# Patient Record
Sex: MALE | Race: WHITE | NOT HISPANIC OR LATINO | Employment: OTHER | URBAN - NONMETROPOLITAN AREA
[De-identification: names, ages, dates, MRNs, and addresses within clinical notes are randomized per-mention and may not be internally consistent; named-entity substitution may affect disease eponyms.]

---

## 2023-11-08 ENCOUNTER — APPOINTMENT (OUTPATIENT)
Dept: SLEEP MEDICINE | Facility: HOSPITAL | Age: 43
End: 2023-11-08
Payer: MEDICAID

## 2023-11-15 ENCOUNTER — CLINICAL SUPPORT (OUTPATIENT)
Dept: SLEEP MEDICINE | Facility: CLINIC | Age: 43
End: 2023-11-15
Payer: MEDICAID

## 2023-11-15 VITALS
BODY MASS INDEX: 30.12 KG/M2 | HEIGHT: 65 IN | SYSTOLIC BLOOD PRESSURE: 120 MMHG | DIASTOLIC BLOOD PRESSURE: 86 MMHG | TEMPERATURE: 98 F | WEIGHT: 180.8 LBS

## 2023-11-15 DIAGNOSIS — G47.33 OBSTRUCTIVE SLEEP APNEA (ADULT) (PEDIATRIC): ICD-10-CM

## 2023-11-15 PROCEDURE — 95810 POLYSOM 6/> YRS 4/> PARAM: CPT | Performed by: INTERNAL MEDICINE

## 2023-11-15 ASSESSMENT — SLEEP AND FATIGUE QUESTIONNAIRES
HOW LIKELY ARE YOU TO NOD OFF OR FALL ASLEEP WHILE LYING DOWN TO REST IN THE AFTERNOON WHEN CIRCUMSTANCES PERMIT: HIGH CHANCE OF DOZING
HOW LIKELY ARE YOU TO NOD OFF OR FALL ASLEEP WHILE SITTING QUIETLY AFTER LUNCH WITHOUT ALCOHOL: SLIGHT CHANCE OF DOZING
HOW LIKELY ARE YOU TO NOD OFF OR FALL ASLEEP WHILE SITTING AND TALKING TO SOMEONE: SLIGHT CHANCE OF DOZING
HOW LIKELY ARE YOU TO NOD OFF OR FALL ASLEEP WHILE WATCHING TV: HIGH CHANCE OF DOZING
HOW LIKELY ARE YOU TO NOD OFF OR FALL ASLEEP IN A CAR, WHILE STOPPED FOR A FEW MINUTES IN TRAFFIC: WOULD NEVER DOZE
HOW LIKELY ARE YOU TO NOD OFF OR FALL ASLEEP WHILE SITTING AND READING: HIGH CHANCE OF DOZING
ESS-CHAD TOTAL SCORE: 16
HOW LIKELY ARE YOU TO NOD OFF OR FALL ASLEEP WHEN YOU ARE A PASSENGER IN A CAR FOR AN HOUR WITHOUT A BREAK: HIGH CHANCE OF DOZING
SITING INACTIVE IN A PUBLIC PLACE LIKE A CLASS ROOM OR A MOVIE THEATER: MODERATE CHANCE OF DOZING

## 2023-11-16 NOTE — PROGRESS NOTES
[facility] TECH NOTE:     Patient: Julián Ulloa   MRN//AGE: [id number]  1980  43 y.o.   Technologist: JUNIOR Irizarry   Room: 4   Service Date: 2023        Sleep Testing Location: [place] [address]      [place]: 8    TECHNOLOGIST SLEEP STUDY PROCEDURE NOTE:   This sleep study is being conducted according to the policies and procedures outlined by the [employer]AS accreditation standards.  The sleep study procedure and processes involved during this appointment was explained to the patient and all questions were answered. The patient  verbalized understanding.      The patient is a 43 y.o. year old male scheduled for aDiagnostic PSG Split night with montage of: PSG/CPAP. he arrived for his appointment as scheduled.      The study that was ultimately completed was a PSG with montage of: PSG.    The full study Was completed.  Patient questionnaires completed?: yes     Consents signed? yes    Initial Fall Risk Screening:     Julián has not fallen in the last 6 months.  Julián does have a fear of falling. He does need assistance with sitting, standing, or walking. he does need assistance walking in his home. he does need assistance in an unfamiliar setting. The patient isusing an assistive device.     Brief Study observations: Mr. Ulloa arrived via nursing home transportation. He was escorted to room 4 where consent was signed and all questions were answered. Criteria for split study was not met as per department protocol. He did experience REM related obstructive hypopneas early in the study. Later in the study, he expereinced REM and NREM related events while supine and on his side. Events late in the study included obstructive, central and mixed apneas. He experienced significant desaturations during REM sleep late in the study. Ricco Spo2 low 60s with supine events. Frequent, loud snoring was noted. He self-administered all nighttime medications before bed.    Deviation to order/protocol and reason: Qualifications  were not met for a split study as per departmental protocol.        After the procedure, the patient was informed to ensure followup with ordering clinician for testing results. A note was sent to nursing  home staff reminding them to schedule an appointment for follow up with his physician.      Technologist: JUNIOR Irizarry

## 2024-08-08 PROBLEM — E11.21: Status: ACTIVE | Noted: 2024-08-08

## 2024-08-08 PROBLEM — F17.210 CIGARETTE NICOTINE DEPENDENCE WITHOUT COMPLICATION: Status: ACTIVE | Noted: 2024-08-08

## 2024-08-08 PROBLEM — I12.9 HYPERTENSIVE CHRONIC KIDNEY DISEASE WITH STAGE 1 THROUGH STAGE 4 CHRONIC KIDNEY DISEASE, OR UNSPECIFIED CHRONIC KIDNEY DISEASE: Status: ACTIVE | Noted: 2024-08-08

## 2024-08-08 PROBLEM — G47.00 INSOMNIA, UNSPECIFIED: Status: ACTIVE | Noted: 2024-08-08

## 2024-08-08 PROBLEM — R13.11 DYSPHAGIA, ORAL PHASE: Status: ACTIVE | Noted: 2024-08-08

## 2024-08-08 PROBLEM — I69.354 HEMIPLEGIA AND HEMIPARESIS FOLLOWING CEREBRAL INFARCTION AFFECTING LEFT NON-DOMINANT SIDE (MULTI): Status: ACTIVE | Noted: 2024-08-08

## 2024-08-08 PROBLEM — E78.5 HYPERLIPIDEMIA: Status: ACTIVE | Noted: 2024-08-08

## 2024-08-08 PROBLEM — M62.81 MUSCLE WEAKNESS (GENERALIZED): Status: ACTIVE | Noted: 2024-08-08

## 2024-08-08 PROBLEM — F32.5 MAJOR DEPRESSIVE DISORDER, SINGLE EPISODE IN FULL REMISSION (CMS-HCC): Status: ACTIVE | Noted: 2024-08-08

## 2024-12-17 ENCOUNTER — NURSING HOME VISIT (OUTPATIENT)
Facility: EXTERNAL LOCATION | Age: 44
End: 2024-12-17
Payer: MEDICARE

## 2024-12-17 DIAGNOSIS — I69.959 HEMIPLEGIA OF NONDOMINANT SIDE AS LATE EFFECT OF CEREBROVASCULAR DISEASE (MULTI): ICD-10-CM

## 2024-12-17 DIAGNOSIS — Z79.4 TYPE 2 DIABETES MELLITUS WITH DIABETIC NEPHROPATHY, WITH LONG-TERM CURRENT USE OF INSULIN: ICD-10-CM

## 2024-12-17 DIAGNOSIS — E11.21 TYPE 2 DIABETES MELLITUS WITH DIABETIC NEPHROPATHY, WITH LONG-TERM CURRENT USE OF INSULIN: ICD-10-CM

## 2024-12-17 DIAGNOSIS — I12.9 HYPERTENSIVE CHRONIC KIDNEY DISEASE WITH STAGE 1 THROUGH STAGE 4 CHRONIC KIDNEY DISEASE, OR UNSPECIFIED CHRONIC KIDNEY DISEASE: Primary | ICD-10-CM

## 2024-12-17 PROCEDURE — 99305 1ST NF CARE MODERATE MDM 35: CPT | Performed by: INTERNAL MEDICINE

## 2024-12-17 NOTE — PROGRESS NOTES
Name: Julián Ulloa  YOB: 1980    INITIAL VISIT: [facility],     [facility]   Patient seen and examined here at [facility].  He was resting comfortably in bed when I saw him  Was easily awakened.  Seems to be doing well without any major complaints or needs at this time  REVIEW OF SYSTEMS:  Review of systems are negative except where noted in HPI.    There were no vitals taken for this visit.   Vitals reviewed and blood pressure quite well-controlled  Physical Exam  Constitutional:       Appearance: Normal appearance.   HENT:      Head: Normocephalic and atraumatic.      Right Ear: External ear normal.      Left Ear: External ear normal.      Nose: Nose normal.      Mouth/Throat:      Mouth: Mucous membranes are moist.      Pharynx: Oropharynx is clear.   Eyes:      Extraocular Movements: Extraocular movements intact.      Conjunctiva/sclera: Conjunctivae normal.      Pupils: Pupils are equal, round, and reactive to light.   Cardiovascular:      Rate and Rhythm: Normal rate and regular rhythm.   Pulmonary:      Effort: Pulmonary effort is normal.      Breath sounds: Normal breath sounds.   Abdominal:      General: Abdomen is flat.      Palpations: Abdomen is soft.   Skin:     General: Skin is warm and dry.   Neurological:      General: No focal deficit present.      Mental Status: He is alert and oriented to person, place, and time.   Psychiatric:         Mood and Affect: Mood normal.         Behavior: Behavior normal.            CODE STATUS: Full code      No visits with results within 1 Week(s) from this visit.   Latest known visit with results is:   No results found for any previous visit.       LABORATORIES OR DIAGNOSTIC TESTS DONE/REVIEWED IN FACILITY:    Not on File      MEDICATIONS RECONCILED AT THE FACILITY:  Please see Nursing facility Medication EMR for full updated list.    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

## 2024-12-17 NOTE — LETTER
Patient: Julián Ulloa  : 1980    Encounter Date: 2024    Name: Julián Ulloa  YOB: 1980    INITIAL VISIT: [facility],     [facility]   Patient seen and examined here at [facility].  He was resting comfortably in bed when I saw him  Was easily awakened.  Seems to be doing well without any major complaints or needs at this time  REVIEW OF SYSTEMS:  Review of systems are negative except where noted in HPI.    There were no vitals taken for this visit.   Vitals reviewed and blood pressure quite well-controlled  Physical Exam  Constitutional:       Appearance: Normal appearance.   HENT:      Head: Normocephalic and atraumatic.      Right Ear: External ear normal.      Left Ear: External ear normal.      Nose: Nose normal.      Mouth/Throat:      Mouth: Mucous membranes are moist.      Pharynx: Oropharynx is clear.   Eyes:      Extraocular Movements: Extraocular movements intact.      Conjunctiva/sclera: Conjunctivae normal.      Pupils: Pupils are equal, round, and reactive to light.   Cardiovascular:      Rate and Rhythm: Normal rate and regular rhythm.   Pulmonary:      Effort: Pulmonary effort is normal.      Breath sounds: Normal breath sounds.   Abdominal:      General: Abdomen is flat.      Palpations: Abdomen is soft.   Skin:     General: Skin is warm and dry.   Neurological:      General: No focal deficit present.      Mental Status: He is alert and oriented to person, place, and time.   Psychiatric:         Mood and Affect: Mood normal.         Behavior: Behavior normal.            CODE STATUS: Full code      No visits with results within 1 Week(s) from this visit.   Latest known visit with results is:   No results found for any previous visit.       LABORATORIES OR DIAGNOSTIC TESTS DONE/REVIEWED IN FACILITY:    Not on File      MEDICATIONS RECONCILED AT THE FACILITY:  Please see Nursing facility Medication EMR for full updated list.    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

## 2025-01-28 ENCOUNTER — NURSING HOME VISIT (OUTPATIENT)
Facility: EXTERNAL LOCATION | Age: 45
End: 2025-01-28
Payer: MEDICARE

## 2025-01-28 DIAGNOSIS — Z79.4 TYPE 2 DIABETES MELLITUS WITH DIABETIC NEPHROPATHY, WITH LONG-TERM CURRENT USE OF INSULIN: ICD-10-CM

## 2025-01-28 DIAGNOSIS — E11.21 TYPE 2 DIABETES MELLITUS WITH DIABETIC NEPHROPATHY, WITH LONG-TERM CURRENT USE OF INSULIN: ICD-10-CM

## 2025-01-28 DIAGNOSIS — E78.2 MIXED HYPERLIPIDEMIA: Primary | ICD-10-CM

## 2025-01-28 PROCEDURE — 99308 SBSQ NF CARE LOW MDM 20: CPT | Performed by: INTERNAL MEDICINE

## 2025-01-28 NOTE — PROGRESS NOTES
Name: Julián Ulloa  YOB: 1980    FOLLOW UP VISIT: SNF,     SUBJECTIVE:  Patient seen and examined at the bedside here at [facility]  He is resting comfortably in bed  Denies any major complaints at this time  Denies any needs not being met  REVIEW OF SYSTEMS:   All review of systems are negative unless otherwise stated above under subjective.    LABS REVIEWED AT FACILITY:       OBJECTIVE:  Vitals reviewed and very stable at this time  Physical Exam  Constitutional:       Appearance: Normal appearance.   HENT:      Head: Normocephalic and atraumatic.      Right Ear: External ear normal.      Left Ear: External ear normal.      Nose: Nose normal.      Mouth/Throat:      Mouth: Mucous membranes are moist.      Pharynx: Oropharynx is clear.   Eyes:      Extraocular Movements: Extraocular movements intact.      Conjunctiva/sclera: Conjunctivae normal.      Pupils: Pupils are equal, round, and reactive to light.   Cardiovascular:      Rate and Rhythm: Normal rate and regular rhythm.   Pulmonary:      Effort: Pulmonary effort is normal.      Breath sounds: Normal breath sounds.   Abdominal:      General: Abdomen is flat.      Palpations: Abdomen is soft.   Skin:     General: Skin is warm and dry.   Neurological:      General: No focal deficit present.      Mental Status: He is alert and oriented to person, place, and time.   Psychiatric:         Mood and Affect: Mood normal.         Behavior: Behavior normal.         Assessment/Plan   Problem List Items Addressed This Visit       Type 2 diabetes mellitus with established diabetic nephropathy    Hyperlipidemia - Primary   Plan:  Medications are reviewed  Vitals are stable  Clinically appears stable  CODE STATUS is full  Continue medical support as he is on      Keron Whiting DO

## 2025-01-28 NOTE — LETTER
Patient: Julián Ulloa  : 1980    Encounter Date: 2025    Name: Julián Ulloa  YOB: 1980    FOLLOW UP VISIT: SNF,     SUBJECTIVE:  Patient seen and examined at the bedside here at [facility]  He is resting comfortably in bed  Denies any major complaints at this time  Denies any needs not being met  REVIEW OF SYSTEMS:   All review of systems are negative unless otherwise stated above under subjective.    LABS REVIEWED AT FACILITY:       OBJECTIVE:  Vitals reviewed and very stable at this time  Physical Exam  Constitutional:       Appearance: Normal appearance.   HENT:      Head: Normocephalic and atraumatic.      Right Ear: External ear normal.      Left Ear: External ear normal.      Nose: Nose normal.      Mouth/Throat:      Mouth: Mucous membranes are moist.      Pharynx: Oropharynx is clear.   Eyes:      Extraocular Movements: Extraocular movements intact.      Conjunctiva/sclera: Conjunctivae normal.      Pupils: Pupils are equal, round, and reactive to light.   Cardiovascular:      Rate and Rhythm: Normal rate and regular rhythm.   Pulmonary:      Effort: Pulmonary effort is normal.      Breath sounds: Normal breath sounds.   Abdominal:      General: Abdomen is flat.      Palpations: Abdomen is soft.   Skin:     General: Skin is warm and dry.   Neurological:      General: No focal deficit present.      Mental Status: He is alert and oriented to person, place, and time.   Psychiatric:         Mood and Affect: Mood normal.         Behavior: Behavior normal.         Assessment/Plan  Problem List Items Addressed This Visit       Type 2 diabetes mellitus with established diabetic nephropathy    Hyperlipidemia - Primary   Plan:  Medications are reviewed  Vitals are stable  Clinically appears stable  CODE STATUS is full  Continue medical support as he is on      Keron Whiting DO      Electronically Signed By: Keron Whiting DO   25 10:13 AM

## 2025-04-22 ENCOUNTER — NURSING HOME VISIT (OUTPATIENT)
Facility: EXTERNAL LOCATION | Age: 45
End: 2025-04-22
Payer: MEDICARE

## 2025-04-22 DIAGNOSIS — I69.354 HEMIPLEGIA AND HEMIPARESIS FOLLOWING CEREBRAL INFARCTION AFFECTING LEFT NON-DOMINANT SIDE (MULTI): ICD-10-CM

## 2025-04-22 DIAGNOSIS — E11.21 TYPE 2 DIABETES MELLITUS WITH DIABETIC NEPHROPATHY, WITHOUT LONG-TERM CURRENT USE OF INSULIN: ICD-10-CM

## 2025-04-22 DIAGNOSIS — I12.9 HYPERTENSIVE CHRONIC KIDNEY DISEASE WITH STAGE 1 THROUGH STAGE 4 CHRONIC KIDNEY DISEASE, OR UNSPECIFIED CHRONIC KIDNEY DISEASE: ICD-10-CM

## 2025-04-22 DIAGNOSIS — E78.2 MIXED HYPERLIPIDEMIA: Primary | ICD-10-CM

## 2025-04-22 PROCEDURE — 99308 SBSQ NF CARE LOW MDM 20: CPT | Performed by: INTERNAL MEDICINE

## 2025-04-22 NOTE — PROGRESS NOTES
Name: Julián Ulloa  YOB: 1980    FOLLOW UP VISIT: SNF,     SUBJECTIVE:  Patient seen and examined here at [facility] for follow-up  When I saw him he is resting comfortably in bed  Has no major complaints and states he is doing well without any issues currently    REVIEW OF SYSTEMS:   All review of systems are negative unless otherwise stated above under subjective.    LABS REVIEWED AT FACILITY:       OBJECTIVE:  132/84 at last check  Physical Exam  Constitutional:       Appearance: Normal appearance.   HENT:      Head: Normocephalic and atraumatic.      Right Ear: External ear normal.      Left Ear: External ear normal.      Nose: Nose normal.      Mouth/Throat:      Mouth: Mucous membranes are moist.      Pharynx: Oropharynx is clear.   Eyes:      Extraocular Movements: Extraocular movements intact.      Conjunctiva/sclera: Conjunctivae normal.      Pupils: Pupils are equal, round, and reactive to light.   Cardiovascular:      Rate and Rhythm: Normal rate and regular rhythm.   Pulmonary:      Effort: Pulmonary effort is normal.      Breath sounds: Normal breath sounds.   Abdominal:      General: Abdomen is flat.      Palpations: Abdomen is soft.   Skin:     General: Skin is warm and dry.   Neurological:      General: No focal deficit present.      Mental Status: He is alert and oriented to person, place, and time.   Psychiatric:         Mood and Affect: Mood normal.         Behavior: Behavior normal.         Assessment/Plan   Problem List Items Addressed This Visit       Hemiplegia and hemiparesis following cerebral infarction affecting left non-dominant side (Multi)    Type 2 diabetes mellitus with established diabetic nephropathy    Hypertensive chronic kidney disease with stage 1 through stage 4 chronic kidney disease, or unspecified chronic kidney disease    Hyperlipidemia - Primary   Plan:  Full code  Medications reviewed  Vitals are stable  Has no major complaints seems to be  doing well with no issues currently at this time  Continue supportive measures as he is on      Keron Whiting, DO

## 2025-04-22 NOTE — LETTER
Patient: Julián Ulloa  : 1980    Encounter Date: 2025    Name: Julián Ulloa  YOB: 1980    FOLLOW UP VISIT: SNF,     SUBJECTIVE:  Patient seen and examined here at [facility] for follow-up  When I saw him he is resting comfortably in bed  Has no major complaints and states he is doing well without any issues currently    REVIEW OF SYSTEMS:   All review of systems are negative unless otherwise stated above under subjective.    LABS REVIEWED AT FACILITY:       OBJECTIVE:  132/84 at last check  Physical Exam  Constitutional:       Appearance: Normal appearance.   HENT:      Head: Normocephalic and atraumatic.      Right Ear: External ear normal.      Left Ear: External ear normal.      Nose: Nose normal.      Mouth/Throat:      Mouth: Mucous membranes are moist.      Pharynx: Oropharynx is clear.   Eyes:      Extraocular Movements: Extraocular movements intact.      Conjunctiva/sclera: Conjunctivae normal.      Pupils: Pupils are equal, round, and reactive to light.   Cardiovascular:      Rate and Rhythm: Normal rate and regular rhythm.   Pulmonary:      Effort: Pulmonary effort is normal.      Breath sounds: Normal breath sounds.   Abdominal:      General: Abdomen is flat.      Palpations: Abdomen is soft.   Skin:     General: Skin is warm and dry.   Neurological:      General: No focal deficit present.      Mental Status: He is alert and oriented to person, place, and time.   Psychiatric:         Mood and Affect: Mood normal.         Behavior: Behavior normal.         Assessment/Plan  Problem List Items Addressed This Visit       Hemiplegia and hemiparesis following cerebral infarction affecting left non-dominant side (Multi)    Type 2 diabetes mellitus with established diabetic nephropathy    Hypertensive chronic kidney disease with stage 1 through stage 4 chronic kidney disease, or unspecified chronic kidney disease    Hyperlipidemia - Primary   Plan:  Full  code  Medications reviewed  Vitals are stable  Has no major complaints seems to be doing well with no issues currently at this time  Continue supportive measures as he is on      Keron Whiting DO    Electronically Signed By: Keron Whiting DO   4/22/25  4:00 PM

## 2025-07-14 ENCOUNTER — NURSING HOME VISIT (OUTPATIENT)
Facility: EXTERNAL LOCATION | Age: 45
End: 2025-07-14
Payer: MEDICARE

## 2025-07-14 DIAGNOSIS — I69.354 HEMIPLEGIA AND HEMIPARESIS FOLLOWING CEREBRAL INFARCTION AFFECTING LEFT NON-DOMINANT SIDE (MULTI): ICD-10-CM

## 2025-07-14 DIAGNOSIS — E78.2 MIXED HYPERLIPIDEMIA: Primary | ICD-10-CM

## 2025-07-14 DIAGNOSIS — E11.21 TYPE 2 DIABETES MELLITUS WITH DIABETIC NEPHROPATHY, WITHOUT LONG-TERM CURRENT USE OF INSULIN: ICD-10-CM

## 2025-07-14 DIAGNOSIS — M62.81 MUSCLE WEAKNESS (GENERALIZED): ICD-10-CM

## 2025-07-14 PROCEDURE — 99308 SBSQ NF CARE LOW MDM 20: CPT | Performed by: INTERNAL MEDICINE

## 2025-07-14 NOTE — LETTER
Patient: Julián Ulloa  : 1980    Encounter Date: 2025    Name: Julián Ulloa  YOB: 1980    FOLLOW UP VISIT: SNF,     SUBJECTIVE:  Patient seen and examined at [facility] for follow-up.  When I saw him he is resting in bed.  He is on his devices and listening to music  He states he is doing well.  He states he is getting everything he needs has no major complaints at this time  He was inquiring about his make-up drinks that he has ordered and have not come in yet  Otherwise no issues or needs at this time  REVIEW OF SYSTEMS:   All review of systems are negative unless otherwise stated above under subjective.    LABS REVIEWED AT FACILITY:       OBJECTIVE:  Blood pressure is 131/67  Physical Exam  Constitutional:       Appearance: Normal appearance.   HENT:      Head: Normocephalic and atraumatic.      Right Ear: External ear normal.      Left Ear: External ear normal.      Nose: Nose normal.      Mouth/Throat:      Mouth: Mucous membranes are moist.      Pharynx: Oropharynx is clear.   Eyes:      Extraocular Movements: Extraocular movements intact.      Conjunctiva/sclera: Conjunctivae normal.      Pupils: Pupils are equal, round, and reactive to light.   Cardiovascular:      Rate and Rhythm: Normal rate and regular rhythm.   Pulmonary:      Effort: Pulmonary effort is normal.      Breath sounds: Normal breath sounds.   Abdominal:      General: Abdomen is flat.      Palpations: Abdomen is soft.   Skin:     General: Skin is warm and dry.   Neurological:      General: No focal deficit present.      Mental Status: He is alert and oriented to person, place, and time.   Psychiatric:         Mood and Affect: Mood normal.         Behavior: Behavior normal.         Assessment/Plan  Problem List Items Addressed This Visit       Hemiplegia and hemiparesis following cerebral infarction affecting left non-dominant side (Multi)    Type 2 diabetes mellitus with established diabetic  nephropathy    Hyperlipidemia - Primary    Muscle weakness (generalized)   Plan:  Full code  At this time vitals are stable  Medications are reviewed and appear appropriate  Continue his medical management and supportive measures as he is on        Keron Whiting DO    Electronically Signed By: Keron Whiting DO   7/14/25  2:16 PM

## 2025-07-14 NOTE — PROGRESS NOTES
Name: Julián Ulloa  YOB: 1980    FOLLOW UP VISIT: SNF,     SUBJECTIVE:  Patient seen and examined at [facility] for follow-up.  When I saw him he is resting in bed.  He is on his devices and listening to music  He states he is doing well.  He states he is getting everything he needs has no major complaints at this time  He was inquiring about his make-up drinks that he has ordered and have not come in yet  Otherwise no issues or needs at this time  REVIEW OF SYSTEMS:   All review of systems are negative unless otherwise stated above under subjective.    LABS REVIEWED AT FACILITY:       OBJECTIVE:  Blood pressure is 131/67  Physical Exam  Constitutional:       Appearance: Normal appearance.   HENT:      Head: Normocephalic and atraumatic.      Right Ear: External ear normal.      Left Ear: External ear normal.      Nose: Nose normal.      Mouth/Throat:      Mouth: Mucous membranes are moist.      Pharynx: Oropharynx is clear.   Eyes:      Extraocular Movements: Extraocular movements intact.      Conjunctiva/sclera: Conjunctivae normal.      Pupils: Pupils are equal, round, and reactive to light.   Cardiovascular:      Rate and Rhythm: Normal rate and regular rhythm.   Pulmonary:      Effort: Pulmonary effort is normal.      Breath sounds: Normal breath sounds.   Abdominal:      General: Abdomen is flat.      Palpations: Abdomen is soft.   Skin:     General: Skin is warm and dry.   Neurological:      General: No focal deficit present.      Mental Status: He is alert and oriented to person, place, and time.   Psychiatric:         Mood and Affect: Mood normal.         Behavior: Behavior normal.         Assessment/Plan   Problem List Items Addressed This Visit       Hemiplegia and hemiparesis following cerebral infarction affecting left non-dominant side (Multi)    Type 2 diabetes mellitus with established diabetic nephropathy    Hyperlipidemia - Primary    Muscle weakness (generalized)    Plan:  Full code  At this time vitals are stable  Medications are reviewed and appear appropriate  Continue his medical management and supportive measures as he is on        Keron Whiting, DO